# Patient Record
Sex: MALE | Race: WHITE | URBAN - METROPOLITAN AREA
[De-identification: names, ages, dates, MRNs, and addresses within clinical notes are randomized per-mention and may not be internally consistent; named-entity substitution may affect disease eponyms.]

---

## 2022-07-09 ENCOUNTER — TELEPHONE ENCOUNTER (OUTPATIENT)
Dept: URBAN - METROPOLITAN AREA CLINIC 121 | Facility: CLINIC | Age: 52
End: 2022-07-09

## 2022-07-10 ENCOUNTER — TELEPHONE ENCOUNTER (OUTPATIENT)
Dept: URBAN - METROPOLITAN AREA CLINIC 121 | Facility: CLINIC | Age: 52
End: 2022-07-10

## 2025-07-09 PROBLEM — 786878009: Status: ACTIVE | Noted: 2025-07-09

## 2025-07-10 ENCOUNTER — DASHBOARD ENCOUNTERS (OUTPATIENT)
Age: 55
End: 2025-07-10

## 2025-07-10 ENCOUNTER — OFFICE VISIT (OUTPATIENT)
Dept: URBAN - METROPOLITAN AREA CLINIC 7 | Facility: CLINIC | Age: 55
End: 2025-07-10
Payer: COMMERCIAL

## 2025-07-10 DIAGNOSIS — R15.2 DEFECATION URGENCY: ICD-10-CM

## 2025-07-10 DIAGNOSIS — K60.30 ANAL FISTULA: ICD-10-CM

## 2025-07-10 PROCEDURE — 99204 OFFICE O/P NEW MOD 45 MIN: CPT

## 2025-07-10 NOTE — PHYSICAL EXAM RECTAL:
no external hemorrhoids or skin tags, no masses palpable, surgical site without evidence of infection

## 2025-07-10 NOTE — HPI-TODAY'S VISIT:
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 He was referred by Dr. Sainz and has a history of recurrent anal abscesses and anal fistula, status post fistulotomy, fistulectomy, and excision of anal tags performed by Dr. Nirali Villarreal on May 21, 2025. Surgical pathology reviewed from that procedure revealed benign skin with a sinus tract and chronic inflammation.He reports developing what he believes was an infection approximately 3-4 weeks postoperatively, which improved with a course of oral antibiotics. He uses sitz baths as needed, which provide some relief. He has a follow-up appointment with Dr. Villarreal scheduled for next week. His last colonoscopy was in March 2025, with a recommended follow-up in 2 years. Notably, he has a family history of colon cancer in both parents and a personal history of colon polyps. He endorses a longstanding history of urgency and increased stool frequency, which has worsened since surgery, though he denies diarrhea, loose stools, blood in the stool, abdominal pain, fever, chills, or unexplained weight loss. He denies any history of tuberculosis, inflammatory bowel disease, or prior rectal cancer or radiation.